# Patient Record
Sex: MALE | URBAN - METROPOLITAN AREA
[De-identification: names, ages, dates, MRNs, and addresses within clinical notes are randomized per-mention and may not be internally consistent; named-entity substitution may affect disease eponyms.]

---

## 2017-09-09 ENCOUNTER — EMERGENCY (EMERGENCY)
Facility: HOSPITAL | Age: 42
LOS: 1 days | Discharge: HOME | End: 2017-09-09
Payer: SELF-PAY

## 2017-09-09 PROCEDURE — 99053 MED SERV 10PM-8AM 24 HR FAC: CPT

## 2017-09-09 PROCEDURE — 72100 X-RAY EXAM L-S SPINE 2/3 VWS: CPT | Mod: 26

## 2017-09-09 PROCEDURE — 99283 EMERGENCY DEPT VISIT LOW MDM: CPT | Mod: 25

## 2017-09-09 PROCEDURE — T1013: CPT

## 2017-09-09 PROCEDURE — 72100 X-RAY EXAM L-S SPINE 2/3 VWS: CPT

## 2018-01-12 PROBLEM — Z00.00 ENCOUNTER FOR PREVENTIVE HEALTH EXAMINATION: Status: ACTIVE | Noted: 2018-01-12

## 2022-12-16 ENCOUNTER — APPOINTMENT (OUTPATIENT)
Dept: NEPHROLOGY | Facility: CLINIC | Age: 47
End: 2022-12-16

## 2022-12-16 ENCOUNTER — NON-APPOINTMENT (OUTPATIENT)
Age: 47
End: 2022-12-16

## 2022-12-16 VITALS
DIASTOLIC BLOOD PRESSURE: 86 MMHG | HEART RATE: 71 BPM | OXYGEN SATURATION: 98 % | WEIGHT: 139 LBS | HEIGHT: 67 IN | BODY MASS INDEX: 21.82 KG/M2 | SYSTOLIC BLOOD PRESSURE: 124 MMHG

## 2022-12-16 DIAGNOSIS — N18.9 CHRONIC KIDNEY DISEASE, UNSPECIFIED: ICD-10-CM

## 2022-12-16 DIAGNOSIS — R31.29 OTHER MICROSCOPIC HEMATURIA: ICD-10-CM

## 2022-12-16 DIAGNOSIS — Z00.00 ENCOUNTER FOR GENERAL ADULT MEDICAL EXAMINATION W/OUT ABNORMAL FINDINGS: ICD-10-CM

## 2022-12-16 PROCEDURE — 99205 OFFICE O/P NEW HI 60 MIN: CPT | Mod: 25

## 2022-12-16 PROCEDURE — 36415 COLL VENOUS BLD VENIPUNCTURE: CPT

## 2022-12-16 NOTE — ASSESSMENT
[FreeTextEntry1] : hEMATURIA, \par \par W.U, \par \par > IgAN  ?\par \par Alport's Syndrome, \par \par D/W The patient @ Length,

## 2022-12-16 NOTE — HISTORY OF PRESENT ILLNESS
[FreeTextEntry1] : Ref. here for Evaln., Of Painless Hematuria, \par \par No FH : ESRD , \par \par No Respiratory, GI Illnesses, \par \par Denies usage of NSAIDs,

## 2022-12-16 NOTE — PHYSICAL EXAM
[General Appearance - Alert] : alert [General Appearance - In No Acute Distress] : in no acute distress [General Appearance - Well Nourished] : well nourished [General Appearance - Well Developed] : well developed [Sclera] : the sclera and conjunctiva were normal [PERRL With Normal Accommodation] : pupils were equal in size, round, and reactive to light [Extraocular Movements] : extraocular movements were intact [Strabismus] : no strabismus was seen [Outer Ear] : the ears and nose were normal in appearance [Hearing Threshold Finger Rub Not Christian] : hearing was normal [Both Tympanic Membranes Were Examined] : both tympanic membranes were normal [Nasal Cavity] : the nasal mucosa and septum were normal [Oropharynx] : the oropharynx was normal [Neck Appearance] : the appearance of the neck was normal [Neck Cervical Mass (___cm)] : no neck mass was observed [Jugular Venous Distention Increased] : there was no jugular-venous distention [Thyroid Diffuse Enlargement] : the thyroid was not enlarged [Thyroid Nodule] : there were no palpable thyroid nodules [Auscultation Breath Sounds / Voice Sounds] : lungs were clear to auscultation bilaterally [Heart Rate And Rhythm] : heart rate was normal and rhythm regular [Heart Sounds] : normal S1 and S2 [Heart Sounds Gallop] : no gallops [Murmurs] : no murmurs [Heart Sounds Pericardial Friction Rub] : no pericardial rub [Bowel Sounds] : normal bowel sounds [Abdomen Soft] : soft [Abdomen Tenderness] : non-tender [Abdomen Mass (___ Cm)] : no abdominal mass palpated [Cervical Lymph Nodes Enlarged Posterior Bilaterally] : posterior cervical [Cervical Lymph Nodes Enlarged Anterior Bilaterally] : anterior cervical [Supraclavicular Lymph Nodes Enlarged Bilaterally] : supraclavicular [Axillary Lymph Nodes Enlarged Bilaterally] : axillary [Femoral Lymph Nodes Enlarged Bilaterally] : femoral [Inguinal Lymph Nodes Enlarged Bilaterally] : inguinal [No CVA Tenderness] : no ~M costovertebral angle tenderness [No Spinal Tenderness] : no spinal tenderness [Abnormal Walk] : normal gait [Nail Clubbing] : no clubbing  or cyanosis of the fingernails [Musculoskeletal - Swelling] : no joint swelling seen [Motor Tone] : muscle strength and tone were normal [Skin Color & Pigmentation] : normal skin color and pigmentation [Skin Turgor] : normal skin turgor [] : no rash [Deep Tendon Reflexes (DTR)] : deep tendon reflexes were 2+ and symmetric [Sensation] : the sensory exam was normal to light touch and pinprick [No Focal Deficits] : no focal deficits [Oriented To Time, Place, And Person] : oriented to person, place, and time [Impaired Insight] : insight and judgment were intact [Affect] : the affect was normal

## 2022-12-20 LAB
ALBUMIN SERPL ELPH-MCNC: 4.6 G/DL
ANION GAP SERPL CALC-SCNC: 12 MMOL/L
APPEARANCE: CLEAR
BACTERIA: NEGATIVE
BASOPHILS # BLD AUTO: 0.05 K/UL
BASOPHILS NFR BLD AUTO: 0.9 %
BILIRUBIN URINE: NEGATIVE
BLOOD URINE: NEGATIVE
BUN SERPL-MCNC: 13 MG/DL
CALCIUM SERPL-MCNC: 9.3 MG/DL
CALCIUM SERPL-MCNC: 9.3 MG/DL
CHLORIDE SERPL-SCNC: 105 MMOL/L
CHOLEST SERPL-MCNC: 182 MG/DL
CO2 SERPL-SCNC: 24 MMOL/L
COLOR: COLORLESS
CREAT SERPL-MCNC: 0.69 MG/DL
CREAT SPEC-SCNC: 37 MG/DL
CREAT SPEC-SCNC: 37 MG/DL
CREAT/PROT UR: 0.1 RATIO
EGFR: 116 ML/MIN/1.73M2
EOSINOPHIL # BLD AUTO: 0.15 K/UL
EOSINOPHIL NFR BLD AUTO: 2.7 %
GLUCOSE QUALITATIVE U: NEGATIVE
GLUCOSE SERPL-MCNC: 96 MG/DL
HCT VFR BLD CALC: 47 %
HDLC SERPL-MCNC: 49 MG/DL
HGB BLD-MCNC: 15.2 G/DL
HYALINE CASTS: 0 /LPF
IMM GRANULOCYTES NFR BLD AUTO: 0.4 %
KETONES URINE: NEGATIVE
LDLC SERPL CALC-MCNC: 117 MG/DL
LEUKOCYTE ESTERASE URINE: NEGATIVE
LYMPHOCYTES # BLD AUTO: 2.03 K/UL
LYMPHOCYTES NFR BLD AUTO: 36.6 %
MAN DIFF?: NORMAL
MCHC RBC-ENTMCNC: 28.4 PG
MCHC RBC-ENTMCNC: 32.3 GM/DL
MCV RBC AUTO: 87.7 FL
MICROALBUMIN 24H UR DL<=1MG/L-MCNC: <1.2 MG/DL
MICROALBUMIN/CREAT 24H UR-RTO: NORMAL MG/G
MICROSCOPIC-UA: NORMAL
MONOCYTES # BLD AUTO: 0.52 K/UL
MONOCYTES NFR BLD AUTO: 9.4 %
NEUTROPHILS # BLD AUTO: 2.77 K/UL
NEUTROPHILS NFR BLD AUTO: 50 %
NITRITE URINE: NEGATIVE
NONHDLC SERPL-MCNC: 133 MG/DL
PARATHYROID HORMONE INTACT: 49 PG/ML
PH URINE: 6.5
PHOSPHATE SERPL-MCNC: 3.1 MG/DL
PLATELET # BLD AUTO: 237 K/UL
POTASSIUM SERPL-SCNC: 4.9 MMOL/L
PROT UR-MCNC: 4 MG/DL
PROTEIN URINE: NEGATIVE
RBC # BLD: 5.36 M/UL
RBC # FLD: 13.9 %
RED BLOOD CELLS URINE: 0 /HPF
SODIUM SERPL-SCNC: 141 MMOL/L
SPECIFIC GRAVITY URINE: 1.01
SQUAMOUS EPITHELIAL CELLS: 0 /HPF
TRIGL SERPL-MCNC: 79 MG/DL
URATE SERPL-MCNC: 5.4 MG/DL
UROBILINOGEN URINE: NORMAL
WBC # FLD AUTO: 5.54 K/UL
WHITE BLOOD CELLS URINE: 0 /HPF

## 2022-12-26 ENCOUNTER — APPOINTMENT (OUTPATIENT)
Dept: ULTRASOUND IMAGING | Facility: CLINIC | Age: 47
End: 2022-12-26
Payer: COMMERCIAL

## 2022-12-26 ENCOUNTER — RESULT REVIEW (OUTPATIENT)
Age: 47
End: 2022-12-26

## 2022-12-26 ENCOUNTER — OUTPATIENT (OUTPATIENT)
Dept: OUTPATIENT SERVICES | Facility: HOSPITAL | Age: 47
LOS: 1 days | End: 2022-12-26

## 2022-12-26 DIAGNOSIS — N18.9 CHRONIC KIDNEY DISEASE, UNSPECIFIED: ICD-10-CM

## 2022-12-26 PROCEDURE — 76770 US EXAM ABDO BACK WALL COMP: CPT | Mod: 26

## 2023-01-03 ENCOUNTER — NON-APPOINTMENT (OUTPATIENT)
Age: 48
End: 2023-01-03
